# Patient Record
Sex: FEMALE | Race: WHITE | NOT HISPANIC OR LATINO | URBAN - METROPOLITAN AREA
[De-identification: names, ages, dates, MRNs, and addresses within clinical notes are randomized per-mention and may not be internally consistent; named-entity substitution may affect disease eponyms.]

---

## 2021-07-07 ENCOUNTER — OFFICE VISIT (OUTPATIENT)
Dept: URBAN - METROPOLITAN AREA CLINIC 19 | Facility: CLINIC | Age: 41
End: 2021-07-07
Payer: COMMERCIAL

## 2021-07-07 ENCOUNTER — WEB ENCOUNTER (OUTPATIENT)
Dept: URBAN - METROPOLITAN AREA CLINIC 19 | Facility: CLINIC | Age: 41
End: 2021-07-07

## 2021-07-07 ENCOUNTER — LAB OUTSIDE AN ENCOUNTER (OUTPATIENT)
Dept: URBAN - METROPOLITAN AREA CLINIC 19 | Facility: CLINIC | Age: 41
End: 2021-07-07

## 2021-07-07 VITALS
SYSTOLIC BLOOD PRESSURE: 90 MMHG | DIASTOLIC BLOOD PRESSURE: 64 MMHG | TEMPERATURE: 98.4 F | BODY MASS INDEX: 23.14 KG/M2 | HEIGHT: 66 IN | WEIGHT: 144 LBS | HEART RATE: 75 BPM

## 2021-07-07 DIAGNOSIS — R63.0 LOSS OF APPETITE: ICD-10-CM

## 2021-07-07 DIAGNOSIS — K21.9 GASTROESOPHAGEAL REFLUX DISEASE, UNSPECIFIED WHETHER ESOPHAGITIS PRESENT: ICD-10-CM

## 2021-07-07 DIAGNOSIS — R13.10 DYSPHAGIA, UNSPECIFIED TYPE: ICD-10-CM

## 2021-07-07 DIAGNOSIS — R11.0 NAUSEA: ICD-10-CM

## 2021-07-07 PROBLEM — 23924001: Status: ACTIVE | Noted: 2021-07-07

## 2021-07-07 PROCEDURE — 99204 OFFICE O/P NEW MOD 45 MIN: CPT | Performed by: INTERNAL MEDICINE

## 2021-07-07 PROCEDURE — 99244 OFF/OP CNSLTJ NEW/EST MOD 40: CPT | Performed by: INTERNAL MEDICINE

## 2021-07-07 RX ORDER — PANTOPRAZOLE SODIUM 40 MG/1
1 TABLET TABLET, DELAYED RELEASE ORAL ONCE A DAY
Qty: 90 TABLET | Refills: 1 | OUTPATIENT
Start: 2021-07-07

## 2021-07-07 NOTE — PHYSICAL EXAM GASTROINTESTINAL
Abdomen, soft, epigastric tenderness, nondistended, no guarding or rigidity, no masses palpable, normal bowel sounds, Liver and Spleen, no hepatomegaly present, no hepatosplenomegaly,  Rectal, deferred

## 2021-07-07 NOTE — HPI-ZZZ
The patient was referred by Anaid MA for chest tightness. A copy of this document is being forwarded to the referring provider.   Ms. Lanier is a 40 year-old female who presents today for tightness in chest and chest pain. She reports this has been occurring for 6 weeks. Over this time frame, she has had 4-5 episodes where she will feel a tightness from her esophagus to the epigastric region of her abdomen. The last episode was last week. It can last for minutes to hours. She has tried not laying down for hours after eating. Sometimes it occurs after eating, no matter what it is or how much she eats. She also has nausea and loss of appetite. She has tried pepcid and nexium. The nexium helped her appetite and tightness for a couple days, but then stopped. She is currently not on any medications. She had an EKG and RUQ US 6/9/21- unremarkable. She reports two years ago an ENT did a nasal scope which showed reflux. She also reports dysphagia will food and pills. She will not have to throw up, but she can feel that they will get stuck. She denies blood in stool/black stool. She denies blood thinners, cardiac disease, diabetes, kidney disease, and home O2.

## 2021-07-27 PROBLEM — 79890006: Status: ACTIVE | Noted: 2021-07-07

## 2021-07-28 ENCOUNTER — CLAIMS CREATED FROM THE CLAIM WINDOW (OUTPATIENT)
Dept: URBAN - METROPOLITAN AREA CLINIC 4 | Facility: CLINIC | Age: 41
End: 2021-07-28
Payer: COMMERCIAL

## 2021-07-28 ENCOUNTER — OFFICE VISIT (OUTPATIENT)
Dept: URBAN - METROPOLITAN AREA SURGERY CENTER 31 | Facility: SURGERY CENTER | Age: 41
End: 2021-07-28
Payer: COMMERCIAL

## 2021-07-28 DIAGNOSIS — R13.10 ABNORMAL DEGLUTITION: ICD-10-CM

## 2021-07-28 DIAGNOSIS — K31.89 ACQUIRED DEFORMITY OF DUODENUM: ICD-10-CM

## 2021-07-28 DIAGNOSIS — K31.89 GASTRIC FOVEOLAR HYPERPLASIA: ICD-10-CM

## 2021-07-28 DIAGNOSIS — R11.0 CHRONIC NAUSEA: ICD-10-CM

## 2021-07-28 DIAGNOSIS — K21.9 ACID REFLUX: ICD-10-CM

## 2021-07-28 DIAGNOSIS — K21.9 GASTRO-ESOPHAGEAL REFLUX DISEASE WITHOUT ESOPHAGITIS: ICD-10-CM

## 2021-07-28 PROCEDURE — 43239 EGD BIOPSY SINGLE/MULTIPLE: CPT | Performed by: INTERNAL MEDICINE

## 2021-07-28 PROCEDURE — 88305 TISSUE EXAM BY PATHOLOGIST: CPT | Performed by: PATHOLOGY

## 2021-07-28 PROCEDURE — 88312 SPECIAL STAINS GROUP 1: CPT | Performed by: PATHOLOGY

## 2021-07-28 PROCEDURE — G8907 PT DOC NO EVENTS ON DISCHARG: HCPCS | Performed by: INTERNAL MEDICINE

## 2021-07-28 PROCEDURE — 43249 ESOPH EGD DILATION <30 MM: CPT | Performed by: INTERNAL MEDICINE

## 2021-07-28 RX ORDER — PANTOPRAZOLE SODIUM 40 MG/1
1 TABLET TABLET, DELAYED RELEASE ORAL ONCE A DAY
Qty: 90 TABLET | Refills: 1 | Status: ACTIVE | COMMUNITY
Start: 2021-07-07

## 2022-03-02 ENCOUNTER — DASHBOARD ENCOUNTERS (OUTPATIENT)
Age: 42
End: 2022-03-02

## 2022-03-04 ENCOUNTER — OFFICE VISIT (OUTPATIENT)
Dept: URBAN - METROPOLITAN AREA CLINIC 19 | Facility: CLINIC | Age: 42
End: 2022-03-04
Payer: COMMERCIAL

## 2022-03-04 DIAGNOSIS — R10.9 LEFT SIDED ABDOMINAL PAIN: ICD-10-CM

## 2022-03-04 DIAGNOSIS — K21.9 GASTROESOPHAGEAL REFLUX DISEASE, UNSPECIFIED WHETHER ESOPHAGITIS PRESENT: ICD-10-CM

## 2022-03-04 DIAGNOSIS — R21 RASH: ICD-10-CM

## 2022-03-04 DIAGNOSIS — R11.0 NAUSEA: ICD-10-CM

## 2022-03-04 DIAGNOSIS — R13.10 DYSPHAGIA, UNSPECIFIED TYPE: ICD-10-CM

## 2022-03-04 DIAGNOSIS — K59.01 CONSTIPATION: ICD-10-CM

## 2022-03-04 PROBLEM — 422587007: Status: ACTIVE | Noted: 2021-07-07

## 2022-03-04 PROBLEM — 1806006 RASH: Status: ACTIVE | Noted: 2022-03-04

## 2022-03-04 PROBLEM — 235595009: Status: ACTIVE | Noted: 2021-07-07

## 2022-03-04 PROBLEM — 14760008 CONSTIPATION: Status: ACTIVE | Noted: 2022-03-04

## 2022-03-04 PROBLEM — 40739000: Status: ACTIVE | Noted: 2021-07-07

## 2022-03-04 PROBLEM — 285387005 LEFT SIDED ABDOMINAL PAIN: Status: ACTIVE | Noted: 2022-03-04

## 2022-03-04 PROCEDURE — 99214 OFFICE O/P EST MOD 30 MIN: CPT | Performed by: INTERNAL MEDICINE

## 2022-03-04 RX ORDER — PANTOPRAZOLE SODIUM 40 MG/1
1 TABLET TABLET, DELAYED RELEASE ORAL ONCE A DAY
Qty: 90 TABLET | Refills: 1 | Status: ACTIVE | COMMUNITY
Start: 2021-07-07

## 2022-03-04 NOTE — HPI-ZZZ
7/7/21 (Anaid Valladares, ROSALES):  The patient was referred by Anaid MA for chest tightness. A copy of this document is being forwarded to the referring provider.   Ms. Lanier is a 40 year-old female who presents today for tightness in chest and chest pain. She reports this has been occurring for 6 weeks. Over this time frame, she has had 4-5 episodes where she will feel a tightness from her esophagus to the epigastric region of her abdomen. The last episode was last week. It can last for minutes to hours. She has tried not laying down for hours after eating. Sometimes it occurs after eating, no matter what it is or how much she eats. She also has nausea and loss of appetite. She has tried pepcid and nexium. The nexium helped her appetite and tightness for a couple days, but then stopped. She is currently not on any medications. She had an EKG and RUQ US 6/9/21- unremarkable. She reports two years ago an ENT did a nasal scope which showed reflux. She also reports dysphagia will food and pills. She will not have to throw up, but she can feel that they will get stuck. She denies blood in stool/black stool. She denies blood thinners, cardiac disease, diabetes, kidney disease, and home O2. The patient was referred by Anaid MA for chest tightness. A copy of this document is being forwarded to the referring provider.  3/4/22:  Patient presents for reevaluation of her GI issues.  When she saw my nurse practitioner last year, her symptoms seemed to center around her atypical chest pain.  She tells me today that she had an EKG that was normal, and her EGD on 7/28/21 with my colleague, Dr. Nima Del Cid, showed mild reflux esophagitis - he did an empiric TTS balloon dilation to 20mm because of her dysphagia.  The atypical chest pain/dysphagia symptoms seem to have resolved.    Now, that patient states that her main issue is indigestion/dyspepsia since she was pregnant.  She has an intermittent bloating sensation that she relates to a "slow intestine" that lasts for 2 to 3 weeks at a time.  She relates this sensation to having a pruritic, macular erythematous rash over her extremities and fever blisters.  She has some blood tests drawn which were unremarkable but was not tested for celiac disease.  She was under the impression that a colonoscopy would be the next step - I am not so sure, but if other tests are inconclusive, that would be reasonable.

## 2022-03-06 LAB
DEAMIDATED GLIADIN ABS, IGA: 4
DEAMIDATED GLIADIN ABS, IGG: 2
ENDOMYSIAL ANTIBODY IGA: NEGATIVE
IMMUNOGLOBULIN A, QN, SERUM: 216
T-TRANSGLUTAMINASE (TTG) IGA: <2
T-TRANSGLUTAMINASE (TTG) IGG: <2

## 2022-03-22 ENCOUNTER — TELEPHONE ENCOUNTER (OUTPATIENT)
Dept: URBAN - METROPOLITAN AREA CLINIC 19 | Facility: CLINIC | Age: 42
End: 2022-03-22

## 2022-03-22 RX ORDER — SODIUM, POTASSIUM,MAG SULFATES 17.5-3.13G
254 ML SOLUTION, RECONSTITUTED, ORAL ORAL ONCE
Qty: 1 KIT | Refills: 0 | OUTPATIENT
Start: 2022-03-22 | End: 2022-03-23

## 2022-03-22 RX ORDER — PANTOPRAZOLE SODIUM 40 MG/1
1 TABLET TABLET, DELAYED RELEASE ORAL ONCE A DAY
Qty: 90 TABLET | Refills: 1 | Status: ACTIVE | COMMUNITY
Start: 2021-07-07

## 2022-05-17 PROBLEM — 64226004 COLITIS: Status: ACTIVE | Noted: 2022-03-22

## 2022-05-31 ENCOUNTER — OFFICE VISIT (OUTPATIENT)
Dept: URBAN - METROPOLITAN AREA SURGERY CENTER 31 | Facility: SURGERY CENTER | Age: 42
End: 2022-05-31
Payer: COMMERCIAL

## 2022-05-31 DIAGNOSIS — R10.32 ABDOMINAL CRAMPING IN LEFT LOWER QUADRANT: ICD-10-CM

## 2022-05-31 DIAGNOSIS — D12.3 ADENOMA OF TRANSVERSE COLON: ICD-10-CM

## 2022-05-31 DIAGNOSIS — R93.3 ABN FINDINGS-GI TRACT: ICD-10-CM

## 2022-05-31 DIAGNOSIS — R10.12 ABDOMINAL BURNING SENSATION IN LEFT UPPER QUADRANT: ICD-10-CM

## 2022-05-31 PROCEDURE — 45385 COLONOSCOPY W/LESION REMOVAL: CPT | Performed by: INTERNAL MEDICINE

## 2022-05-31 PROCEDURE — G8907 PT DOC NO EVENTS ON DISCHARG: HCPCS | Performed by: INTERNAL MEDICINE

## 2022-05-31 RX ORDER — PANTOPRAZOLE SODIUM 40 MG/1
1 TABLET TABLET, DELAYED RELEASE ORAL ONCE A DAY
Qty: 90 TABLET | Refills: 1 | Status: ACTIVE | COMMUNITY
Start: 2021-07-07